# Patient Record
Sex: FEMALE | Race: WHITE | NOT HISPANIC OR LATINO | Employment: FULL TIME | URBAN - NONMETROPOLITAN AREA
[De-identification: names, ages, dates, MRNs, and addresses within clinical notes are randomized per-mention and may not be internally consistent; named-entity substitution may affect disease eponyms.]

---

## 2018-05-14 ENCOUNTER — EVALUATION (OUTPATIENT)
Dept: PHYSICAL THERAPY | Facility: CLINIC | Age: 53
End: 2018-05-14
Payer: COMMERCIAL

## 2018-05-14 DIAGNOSIS — M70.61 TROCHANTERIC BURSITIS OF RIGHT HIP: Primary | ICD-10-CM

## 2018-05-14 PROCEDURE — 97112 NEUROMUSCULAR REEDUCATION: CPT | Performed by: PHYSICAL THERAPIST

## 2018-05-14 PROCEDURE — G8979 MOBILITY GOAL STATUS: HCPCS | Performed by: PHYSICAL THERAPIST

## 2018-05-14 PROCEDURE — 97110 THERAPEUTIC EXERCISES: CPT | Performed by: PHYSICAL THERAPIST

## 2018-05-14 PROCEDURE — G8978 MOBILITY CURRENT STATUS: HCPCS | Performed by: PHYSICAL THERAPIST

## 2018-05-14 NOTE — PROGRESS NOTES
PT Evaluation     Today's date: 2018  Patient name: Robin Ferguson  : 1965  MRN: [id number]  Referring provider: Rodolfo Mensah DO  Dx: No diagnosis found  Assessment    Assessment details:   CURRENT FUNCTIONAL STATUS    Driving tolerance 10 minutes  Ascends stairs reciprocally with moderate pain     Difficulty arising from sitting: Significant  Ability to bend/dress lower extremities: Fair     SHORT TERM GOALS (2 WEEKS)    Increase lumbar spine AROM 25% in all planes  Increase hip AROM in flexion and extension  Decrease pain to 0-3/10  Driving tolerance 30 minutes  Ascends stairs reciprocally with minimal pain     Difficulty arising from sitting: Minimal   Ability to bend/dress lower extremities: Fair/Good     LONG TERM GOALS (DISCHARGE)    Lumbar Spine AROM: WNL in all planes  Hip AROM: WNL  Hip Strength: 5/5 t/o  Decrease pain to 0-1/10  Driving tolerance 60 minutes  Ascends stairs reciprocally without pain     Difficulty arising from sitting: None   Ability to bend/dress lower extremities: Good      Prognosis: good    Goals  See assessment details above  Plan  Planned modality interventions: unattended electrical stimulation and thermotherapy: hydrocollator packs  Planned therapy interventions: therapeutic exercise, therapeutic activities, manual therapy and neuromuscular re-education  Frequency: 2x week  Duration in weeks: 4  Plan details: Robin Ferguson is a 46y o  year old female presenting to PT with pain, decreased range of motion, decreased strength, and decreased tolerance to activity  This patient would benefit from skilled PT services to address these issues and to maximize function  A home exercise program was provided and all questions were answered  Thank you for the referral           Subjective Evaluation    History of Present Illness  Mechanism of injury: CC:Right buttock, right lateral hip and right groin pain, and right hip stiffness   Denies having weakness or parasthesias  HPI: The patient's right hip problem began 3-4 weeks ago for no apparent reason  She has had the right buttock and lateral hip pain before, but not the groin pain  She had the trochanteric bursa injected without relief being noted  X-rays of the hip were normal  She is employed full time as a physical therapist   Pain  Current pain ratin  At best pain ratin  At worst pain ratin    Patient Goals  Patient goals for therapy: decreased pain and increased motion  Patient goal: Improved tolerance for sitting and bending  Objective     Postural Observations    Additional Postural Observation Details  No lateral shift  Observations     Additional Observation Details  Gait mildly antalgic on levels and stairs  General Comments     Lumbar Comments  CURRENT OBJECTIVE MEASUREMENTS    Lumbar Spine AROM: F=75 %, EXT=75 %, R SB=50 %, L SB=75 %, with all movements causing right buttock and hip pain  Hip Comments   CURRENT OBJECTIVE MEASUREMENTS    Hip AROM: Mildly limited in flexion and extension     Hip Strength: Deferred           Precautions: None    Daily Treatment Diary     Manual          STM         P/A Mob         Flex/Rot Mob         Hamstring Stretch         Piriformis Stretch                  Exercise Diary          Prone lying         Elbow Props         Press Ups 1/10        SBB         R SG 3/10        LTR         SKTC         DKTC         Flexion Sitting         Flexion Standing         NuStep: S , A         BACK EXT: F  P , C         PYR AB: S , P         Rotary Torso: S  P , C         Hoist Row: S          Lat Pulldown: S         SA Pulldown         Oblique Press         Multi-Hip: F , P         FLEX/EXT         ABD/ADD         Prone Planks         Side Planks         Suspension Planks         Body Mechanics RK        Centralization         Disc mechanics         Lumbar Roll RK                 Modalities          HP/IFC         Traction

## 2018-05-14 NOTE — LETTER
May 16, 2018    [id number]    Patient: Juana Talamantes   YOB: 1965   Date of Visit: 2018     Encounter Diagnosis     ICD-10-CM    1  Trochanteric bursitis of right hip M70 61        Dear Dr Jennifer Senior:    Please review the attached Plan of Care from [facility] recent visit  Please verify that you agree therapy should continue by signing the attached document and sending it back to our office  If you have any questions or concerns, please don't hesitate to call  Sincerely,    Gustavo Colvin, PT      Referring Provider:      I certify that I have read the below Plan of Care and certify the need for these services furnished under this plan of treatment while under my care  [address] [address]  VIA Facsimile: [phone]          PT Evaluation     Today's date: 2018  Patient name: Juana Talamantes  : 1965  MRN: [id number]  Referring provider: Sorin Lindsey DO  Dx: No diagnosis found  Assessment    Assessment details:   CURRENT FUNCTIONAL STATUS    Driving tolerance 10 minutes  Ascends stairs reciprocally with moderate pain     Difficulty arising from sitting: Significant  Ability to bend/dress lower extremities: Fair     SHORT TERM GOALS (2 WEEKS)    Increase lumbar spine AROM 25% in all planes  Increase hip AROM in flexion and extension  Decrease pain to 0-3/10  Driving tolerance 30 minutes  Ascends stairs reciprocally with minimal pain     Difficulty arising from sitting: Minimal   Ability to bend/dress lower extremities: Fair/Good     LONG TERM GOALS (DISCHARGE)    Lumbar Spine AROM: WNL in all planes  Hip AROM: WNL  Hip Strength: 5/5 t/o  Decrease pain to 0-1/10  Driving tolerance 60 minutes  Ascends stairs reciprocally without pain     Difficulty arising from sitting: None   Ability to bend/dress lower extremities: Good      Prognosis: good    Goals  See assessment details above  Plan  Planned modality interventions: unattended electrical stimulation and thermotherapy: hydrocollator packs  Planned therapy interventions: therapeutic exercise, therapeutic activities, manual therapy and neuromuscular re-education  Frequency: 2x week  Duration in weeks: 4  Plan details: Alicia Irwin is a 46y o  year old female presenting to PT with pain, decreased range of motion, decreased strength, and decreased tolerance to activity  This patient would benefit from skilled PT services to address these issues and to maximize function  A home exercise program was provided and all questions were answered  Thank you for the referral           Subjective Evaluation    History of Present Illness  Mechanism of injury: CC:Right buttock, right lateral hip and right groin pain, and right hip stiffness  Denies having weakness or parasthesias  HPI: The patient's right hip problem began 3-4 weeks ago for no apparent reason  She has had the right buttock and lateral hip pain before, but not the groin pain  She had the trochanteric bursa injected without relief being noted  X-rays of the hip were normal  She is employed full time as a physical therapist   Pain  Current pain ratin  At best pain ratin  At worst pain ratin    Patient Goals  Patient goals for therapy: decreased pain and increased motion  Patient goal: Improved tolerance for sitting and bending  Objective     Postural Observations    Additional Postural Observation Details  No lateral shift  Observations     Additional Observation Details  Gait mildly antalgic on levels and stairs  General Comments     Lumbar Comments  CURRENT OBJECTIVE MEASUREMENTS    Lumbar Spine AROM: F=75 %, EXT=75 %, R SB=50 %, L SB=75 %, with all movements causing right buttock and hip pain  Hip Comments   CURRENT OBJECTIVE MEASUREMENTS    Hip AROM: Mildly limited in flexion and extension     Hip Strength: Deferred           Precautions: None    Daily Treatment Diary     Manual  5/14        STM         P/A Mob         Flex/Rot Mob         Hamstring Stretch         Piriformis Stretch                  Exercise Diary          Prone lying         Elbow Props         Press Ups 1/10        SBB         R SG 3/10        LTR         SKTC         DKTC         Flexion Sitting         Flexion Standing         NuStep: S , A         BACK EXT: F  P , C         PYR AB: S , P         Rotary Torso: S  P , C         Hoist Row: S          Lat Pulldown: S         SA Pulldown         Oblique Press         Multi-Hip: F , P         FLEX/EXT         ABD/ADD         Prone Planks         Side Planks         Suspension Planks         Body Mechanics RK        Centralization         Disc mechanics         Lumbar Roll RK                 Modalities          HP/IFC         Traction

## 2018-05-16 ENCOUNTER — OFFICE VISIT (OUTPATIENT)
Dept: PHYSICAL THERAPY | Facility: CLINIC | Age: 53
End: 2018-05-16
Payer: COMMERCIAL

## 2018-05-16 ENCOUNTER — TRANSCRIBE ORDERS (OUTPATIENT)
Dept: PHYSICAL THERAPY | Facility: CLINIC | Age: 53
End: 2018-05-16

## 2018-05-16 DIAGNOSIS — M70.61 TROCHANTERIC BURSITIS OF RIGHT HIP: Primary | ICD-10-CM

## 2018-05-16 PROCEDURE — 97140 MANUAL THERAPY 1/> REGIONS: CPT | Performed by: PHYSICAL THERAPIST

## 2018-05-16 PROCEDURE — 97110 THERAPEUTIC EXERCISES: CPT | Performed by: PHYSICAL THERAPIST

## 2018-05-16 NOTE — PROGRESS NOTES
Daily Note     Today's date: 2018  Patient name: Danya Alcaraz  : 1965  MRN: [id number]  Referring provider: Marni Bettencourt DO  Dx:   Encounter Diagnosis     ICD-10-CM    1  Trochanteric bursitis of right hip M70 61                   Subjective: Overall patient reports having less pain in the right low back and groin  She has no pain in the outside of the hip  Groin pain has been variable while driving  Objective: Lumbar extension and hip flexion are mildly painful  Assessment: After treatment all movements were painfree  Plan: Progress treatment as tolerated  HEP: Hip extension for 10 reps 5 times aday  Precautions: None     Daily Treatment Diary      Manual             Right hip extension mob    RK           Press ups with OP    RK                                                         Exercise Diary                Prone lying               Elbow Props               Press Ups 1/10  2/10           SBB               R SG 3/10  1/10           R hip extension from lunge position    2/10           SKTC               DKTC               Flexion Sitting               Flexion Standing               NuStep: S , A               BACK EXT: F  P , C               PYR AB: S , P               Rotary Torso: S  P , C               Hoist Row: S                Lat Pulldown:  S               SA Pulldown               Oblique Press               Multi-Hip: F , P               FLEX/EXT               ABD/ADD               Prone Planks               Side Planks               Suspension Planks               Body Mechanics RK  RK           Centralization               Disc mechanics               Lumbar Roll RK  RK                           Modalities                HP/IFC               Traction

## 2018-05-21 ENCOUNTER — OFFICE VISIT (OUTPATIENT)
Dept: PHYSICAL THERAPY | Facility: CLINIC | Age: 53
End: 2018-05-21
Payer: COMMERCIAL

## 2018-05-21 DIAGNOSIS — M70.61 TROCHANTERIC BURSITIS OF RIGHT HIP: Primary | ICD-10-CM

## 2018-05-21 PROCEDURE — 97110 THERAPEUTIC EXERCISES: CPT | Performed by: PHYSICAL THERAPIST

## 2018-05-21 PROCEDURE — 97140 MANUAL THERAPY 1/> REGIONS: CPT | Performed by: PHYSICAL THERAPIST

## 2018-05-21 NOTE — LETTER
Dear Dr Caesar Ayoub,          5/21/2018    Lorena Rodriguez was seen for 3 visits, with no improvement noted in her right groin pain  No significant loss of AROM or strength is noted in the right hip  Scour, quadrant, and Viktoria Maida tests are negative  No block to movement is noted during PROM of the right hip  Attached is her office note for today's visit  Subjective: The patient states that her right groin pain has not improved since onset, or since initiating PT treatment  Driving is the most provacative activity for the groin pain  Her right lateral hip and buttock pain have improved since treatment, and when present are very mild in intensity  Her HEP helps the right buttock and lateral hip pain, but not the groin pain  Objective: Lumbar spine and right hip ROM are WFL in all planes, with no obstruction to movement being evident in any plane  Strength testing of the right hip is 5/5 and painfree t/o  A Bhavna assessment did not reveal any directional preference for the low back or right hip  Assessment: Bhavna assessment inconclusive for the right groin pain  Plan: Patient will call Dr Caesar Ayoub to be reevalauted ASAP  She has judi placed on hold from PT at this time  Feel free to contact me if you have any questions      Sincerely,    Laine Sanchez PT, Cert MDT

## 2018-05-21 NOTE — PROGRESS NOTES
Daily Note     Today's date: 2018  Patient name: Troy Dennis  : 1965  MRN: [id number]  Referring provider: Javier Kan DO  Dx:   Encounter Diagnosis     ICD-10-CM    1  Trochanteric bursitis of right hip M70 61                   Subjective: The patient states that her right groin pain has not improved since onset, or since initiating PT treatment  Driving is the most provacative activity for the groin pain  Her right lateral hip and buttock pain have improved since treatment, and when present are very mild in intensity  Her HEP helps the right buttock and lateral hip pain, but not the groin pain  Objective: Lumbar spine and right hip ROM are WFL in all planes, with no obstruction to movement being evident in any plane  Strength testing of the right hip is 5/ and painfree t/o  A Bhavna assessment did not reveal any directional preference for the low back or right hip  Assessment: Bhavna assessment inconclusive for the right groin pain  Plan: Patient will call Dr Landon Shell to be reevalauted ASAP  She has judi placed on hold from PT at this time  Precautions: None     Daily Treatment Diary      Manual           Right hip extension mob    RK  RK         Press ups with OP    RK            Right hip flexor stretch       RK          Christ stretch      RK                         Exercise Diary                Prone lying               Elbow Props               Press Ups 1/10  2/10          SBB      10x         R SG 3/10  1/10           R hip extension from lunge position    2/10           SKTC               DKTC               Flexion Sitting               Flexion Standing      15x         NuStep: S , A               BACK EXT: F  P , C               PYR AB: S , P               Rotary Torso: S  P , C               Hoist Row: S                Lat Pulldown:  S               SA Pulldown               Oblique Press               Multi-Hip: F , P               FLEX/EXT             ABD/ADD               Prone Planks               Side Planks               Suspension Planks               Body Mechanics RK  RK           Centralization               Disc mechanics               Lumbar Roll RK  RK                           Modalities                HP/IFC               Traction

## 2018-06-19 NOTE — PROGRESS NOTES
Discharge Note     Today's date: 2018  Patient name: Danya Alcaraz  : 1965  MRN: [id number]  Referring provider: Marni Bettencourt DO  Dx:   Encounter Diagnosis     ICD-10-CM    1  Trochanteric bursitis of right hip M70 61          Subjective: Patient contacted our office stating that she will be having additional testing done and will not be continuing with PT at this time  Objective: See IE  Assessment: Limited progress in PT  Plan: Marcus Parent will be discharged from outpatient physical therapy care due to expiration of the plan of care  No updated objective measures are available for this report due to self discharge

## 2020-07-07 ENCOUNTER — APPOINTMENT (OUTPATIENT)
Dept: URGENT CARE | Facility: CLINIC | Age: 55
End: 2020-07-07
Payer: OTHER MISCELLANEOUS

## 2020-07-07 LAB — GLUCOSE SERPL-MCNC: 93 MG/DL (ref 65–140)

## 2020-07-07 PROCEDURE — 82948 REAGENT STRIP/BLOOD GLUCOSE: CPT

## 2020-07-07 PROCEDURE — G0382 LEV 3 HOSP TYPE B ED VISIT: HCPCS

## 2020-07-07 PROCEDURE — 99283 EMERGENCY DEPT VISIT LOW MDM: CPT
